# Patient Record
Sex: FEMALE | Race: WHITE | HISPANIC OR LATINO | URBAN - METROPOLITAN AREA
[De-identification: names, ages, dates, MRNs, and addresses within clinical notes are randomized per-mention and may not be internally consistent; named-entity substitution may affect disease eponyms.]

---

## 2024-09-09 ENCOUNTER — OFFICE VISIT (OUTPATIENT)
Dept: URBAN - METROPOLITAN AREA CLINIC 63 | Facility: CLINIC | Age: 44
End: 2024-09-09
Payer: COMMERCIAL

## 2024-09-09 ENCOUNTER — DASHBOARD ENCOUNTERS (OUTPATIENT)
Age: 44
End: 2024-09-09

## 2024-09-09 VITALS
HEART RATE: 63 BPM | BODY MASS INDEX: 21.94 KG/M2 | RESPIRATION RATE: 20 BRPM | OXYGEN SATURATION: 98 % | SYSTOLIC BLOOD PRESSURE: 120 MMHG | WEIGHT: 119.2 LBS | HEIGHT: 62 IN | TEMPERATURE: 98 F | DIASTOLIC BLOOD PRESSURE: 70 MMHG

## 2024-09-09 DIAGNOSIS — R19.7 DIARRHEA, UNSPECIFIED TYPE: ICD-10-CM

## 2024-09-09 DIAGNOSIS — R14.3 EXCESSIVE GAS: ICD-10-CM

## 2024-09-09 DIAGNOSIS — R14.0 ABDOMINAL BLOATING: ICD-10-CM

## 2024-09-09 DIAGNOSIS — Z12.11 SCREENING FOR MALIGNANT NEOPLASM OF COLON: ICD-10-CM

## 2024-09-09 PROCEDURE — 99204 OFFICE O/P NEW MOD 45 MIN: CPT

## 2024-09-09 NOTE — HPI-TODAY'S VISIT:
Patient is a 43-year-old female who presents today as a new patient for complaints of abdominal cramping, abdominal bloating and diarrhea.  Patient states that last July she went to [place] and after that trip she began experiencing diarrhea.  Her PCP did some stool studies which were negative.  She was given a Z-Je and Imodium by her PCP.  At today's visit patient states that she continues to experience loose stool along with abdominal bloating, excessive gas and abdominal cramping that is typically improved after a bowel movement.   Patient denies fever, dysphagia, acid reflux, change in appetite, nausea, vomiting, constipation, hematemesis, hematochezia, melena and unintentional weight loss/gain. She has never had a colonoscopy and has no known family history of colorectal cancers. She denies history of stroke, heart attack, pacemaker, defibrillator, stents, blood thinners, COPD, asthma, LORIE, chronic kidney disease and seizures. She does still continue to consume dairy products and some gas producing foods.  She states that she rarely consumes carbonated beverages. . Abdominal US 4/27/2023 - Mild hepatomegaly - Pancreas is poorly seen due to bowel gas . Labs 8/5/2024 - C. difficile negative - Salmonella/Shigella culture not detected - Campylobacter not detected - Fecal leukocyte stain not detected - Giardia not detected

## 2024-09-16 ENCOUNTER — LAB OUTSIDE AN ENCOUNTER (OUTPATIENT)
Dept: URBAN - METROPOLITAN AREA CLINIC 63 | Facility: CLINIC | Age: 44
End: 2024-09-16

## 2024-11-19 ENCOUNTER — OFFICE VISIT (OUTPATIENT)
Dept: URBAN - METROPOLITAN AREA SURGERY CENTER 4 | Facility: SURGERY CENTER | Age: 44
End: 2024-11-19

## 2024-12-03 ENCOUNTER — OFFICE VISIT (OUTPATIENT)
Dept: URBAN - METROPOLITAN AREA CLINIC 63 | Facility: CLINIC | Age: 44
End: 2024-12-03